# Patient Record
Sex: FEMALE | Race: WHITE
[De-identification: names, ages, dates, MRNs, and addresses within clinical notes are randomized per-mention and may not be internally consistent; named-entity substitution may affect disease eponyms.]

---

## 2017-03-17 ENCOUNTER — HOSPITAL ENCOUNTER (EMERGENCY)
Dept: HOSPITAL 10 - FTE | Age: 52
Discharge: HOME | End: 2017-03-17
Payer: COMMERCIAL

## 2017-03-17 VITALS — BODY MASS INDEX: 40.82 KG/M2 | WEIGHT: 176.37 LBS | HEIGHT: 55 IN

## 2017-03-17 DIAGNOSIS — E11.9: ICD-10-CM

## 2017-03-17 DIAGNOSIS — L02.213: Primary | ICD-10-CM

## 2017-03-17 PROCEDURE — 10061 I&D ABSCESS COMP/MULTIPLE: CPT

## 2017-03-17 NOTE — ERD
ER Documentation


Chief Complaint


Date/Time


DATE: 3/17/17 


TIME: 16:22


Chief Complaint


abscess to chest for the past few days. no fevers no drainage





HPI


Patient is a 51-year-old female with diabetes who presents with an infection.  

The patient says that she has an infection between her breasts.  She says "it 

is hot and tender".  She said that it started as an ingrown pimple last year 

but came back.  She has had no fevers.  The symptoms started on Tuesday.  She 

has had no treatment as of yet.  Upon review of old medical records this is the 

patient's fourth visit to the ER since 2007.





ROS


All systems reviewed and are negative except as per history of present illness.





Medications


Home Meds


Active Scripts


Hydrocodone/Acetaminophen (Norco  Tablet) 1 Each Tablet, 1 TAB PO Q6H Y 

for PAIN, #7 TAB


   Prov:ILIANA MINER MD         3/17/17


Sulfamethoxazole-Trimethoprim* (Bactrim* DS) 800-160 Mg Tab, 1 TAB PO BID for 7 

Days, TAB


   Prov:ILIANA MINER MD         3/17/17


Cephalexin* (Keflex*) 500 Mg Capsule, 500 MG PO QID for 7 Days, CAP


   Prov:ILIANA MINER MD         3/17/17


Pseudoephedrine Hcl (Sudafe 12-Hour) 120 Mg Tablet.er, 120 MG PO BID Y for 

CONGESTION for 14 Days, TAB.SA


   Prov:KRISTA KELSEY-C         11/24/16


Benzonatate* (Tessalon Perle*) 100 Mg Capsule, 100 MG PO Q8H Y for COUGH for 10 

Days, CAP


   Prov:KRISTA KELSEY-C         11/24/16


Amoxicillin* (Amoxicillin*) 500 Mg Cap, 500 MG PO BID for 7 Days, #20 CAP


   Prov:KRISTA KELSEY PA-C         11/24/16


Neomycin/Polymyxin/Hydrocort* (Cortisporin* Otic) 10 Ml Susp, 4 DROP LEFT EAR 

QID, #1 EA


   Prov:KRISTA KELSEY PA-C         11/24/16





Allergies


Allergies:  


Coded Allergies:  


     No Known Allergy (Unverified , 11/23/16)





PMhx/Soc


History of Surgery:  No


Anesthesia Reaction:  No


Hx Neurological Disorder:  No


Hx Psychiatric Problems:  Yes (depression)


Hx Alcohol Use:  No


Hx Substance Use:  No


Hx Tobacco Use:  No





FmHx


Family History:  diabetes





Physical Exam


Vitals





Vital Signs








  Date Time  Temp Pulse Resp B/P Pulse Ox O2 Delivery O2 Flow Rate FiO2


 


3/17/17 10:56 98.7 100 20 140/68 98   








Physical Exam


Const: No acute distress


Head:   Atraumatic 


Eyes:    Normal Conjunctiva


ENT:    Normal External Ears, Nose and Mouth.


Neck:               Full range of motion..~ No meningismus.


Resp:    Clear to auscultation bilaterally


Cardio:    Regular rate and rhythm, no murmurs


Abd:    Soft, non tender, non distended. Normal bowel sounds


Skin:   2 x 3 cm abscess between the breasts with fluctuance and surrounding 

erythema


Back:    No midline or flank tenderness


Ext:    No cyanosis, or edema


Neur:    Awake and alert


Psych:    Normal Mood and Affect


Results 24 hrs





 Current Medications








 Medications


  (Trade)  Dose


 Ordered  Sig/Tess


 Route


 PRN Reason  Start Time


 Stop Time Status Last Admin


Dose Admin


 


 Lidocaine/


 Epinephrine


  (Xylocaine 1%/


 Epi (Mdv) 20 ml)  20 ml  ONCE  ONCE


 SC


   3/17/17 11:30


 3/17/17 11:40 DC  


 


 


 Acetaminophen/


 Hydrocodone Bitart


  (Norco (10/325))  1 tab  ONCE  ONCE


 PO


   3/17/17 11:30


 3/17/17 11:31 DC 3/17/17 11:31


 


 


 Ondansetron HCl


  (Zofran Odt)  4 mg  ONCE  STAT


 ODT


   3/17/17 11:18


 3/17/17 11:20 DC 3/17/17 11:31


 


 


 Cephalexin


  (Keflex)  500 mg  ONCE  ONCE


 PO


   3/17/17 11:30


 3/17/17 11:31 DC 3/17/17 11:31


 


 


 Trimethoprim/


 Sulfamethoxazole


  (Bactrim (Ds))  1 tab  ONCE  ONCE


 PO


   3/17/17 11:30


 3/17/17 11:31 DC 3/17/17 11:31


 


 


 Lidocaine/


 Epinephrine


  (Xylocaine 1%/


 Epi (Mdv) 20 ml)  30 ml  ONCE  ONCE


 SC


   3/17/17 12:00


 3/17/17 12:00 DC  


 


 


 Lidocaine/


 Epinephrine


  (Xylocaine 1%/


 Epi)  30 ml  ONCE  ONCE


 SC


   3/17/17 12:00


 3/17/17 12:01 DC  


 











Procedures/MDM


Abscess Incision and Drainage with irrigation by me:


Location:                   Chest wall between the breasts


Anesthesia:                [Local 1% Lidocaine with epinephrine]


Technique:                  [Irrigated. Disrupted loculations w/ instrumentation

]


Packing:                    [None]


Complications:            [Neurovascularly intact post procedure]





48 hour wound check.  Scar minimization instructions given.





Patient's skin symptoms have stabilized while they have been evaluated in the 

department and are appropriate for outpatient care and work up.


Exam and w/u not consistent w/ sepsis, deep space infection, or foreign body.  

Patient will be given a prescription for Keflex and Bactrim and can follow-up 

with her primary doctor within 2 days for a wound check.





Departure


Diagnosis:  


 Primary Impression:  


 Abscess


Condition:  Fair


Patient Instructions:  Abscess, Incision And Drainage


Referrals:  


Your doctor





Additional Instructions:  


Call your primary care doctor TOMORROW for an appointment during the next 1-2 

days.See the doctor sooner or return here if your condition worsens before your 

appointment time.











ILIANA MINER MD Mar 17, 2017 16:23

## 2017-03-19 ENCOUNTER — HOSPITAL ENCOUNTER (EMERGENCY)
Dept: HOSPITAL 10 - FTE | Age: 52
Discharge: HOME | End: 2017-03-19
Payer: COMMERCIAL

## 2017-03-19 VITALS — HEIGHT: 55 IN | WEIGHT: 181.88 LBS | BODY MASS INDEX: 42.09 KG/M2

## 2017-03-19 DIAGNOSIS — L02.213: Primary | ICD-10-CM

## 2017-03-19 PROCEDURE — 99282 EMERGENCY DEPT VISIT SF MDM: CPT

## 2017-03-19 NOTE — ERD
ER Documentation


Chief Complaint


Date/Time


DATE: 3/19/17 


TIME: 21:05


Chief Complaint


abscess





HPI


51-year-old female presents here in emergency department for recheck of her 

abscess wound. Patient has an abscess wound in the mid chest area, this was 

opened and drained 2 days ago, patient started to take antibiotics as 

prescribed. Patient noticed still draining some pustular discharge, it is still 

open, no iodoform dressing in it. Patient pain is improved, throbbing pain, 4/

10 scale, is worse upon touching the area. Patient denies any fever or chills. 

Patient denies any other new symptoms.





ROS


All systems reviewed and are negative except as per history of present illness.





Medications


Home Meds


Active Scripts


Hydrocodone/Acetaminophen (Norco  Tablet) 1 Each Tablet, 1 TAB PO Q6H Y 

for PAIN, #7 TAB


   Prov:ILIANA MINER MD         3/17/17


Sulfamethoxazole-Trimethoprim* (Bactrim* DS) 800-160 Mg Tab, 1 TAB PO BID for 7 

Days, TAB


   Prov:ILIANA MINER MD         3/17/17


Cephalexin* (Keflex*) 500 Mg Capsule, 500 MG PO QID for 7 Days, CAP


   Prov:ILIANA MINER MD         3/17/17


Pseudoephedrine Hcl (Sudafe 12-Hour) 120 Mg Tablet.er, 120 MG PO BID Y for 

CONGESTION for 14 Days, TAB.SA


   Prov:KRISTA KELSEY-C         11/24/16


Benzonatate* (Tessalon Perle*) 100 Mg Capsule, 100 MG PO Q8H Y for COUGH for 10 

Days, CAP


   Prov:KRISTA KELSEY-C         11/24/16


Amoxicillin* (Amoxicillin*) 500 Mg Cap, 500 MG PO BID for 7 Days, #20 CAP


   Prov:KRISTA KELSEY PA-C         11/24/16


Neomycin/Polymyxin/Hydrocort* (Cortisporin* Otic) 10 Ml Susp, 4 DROP LEFT EAR 

QID, #1 EA


   Prov:KRISTA KELSEY PA-C         11/24/16





Allergies


Allergies:  


Coded Allergies:  


     No Known Allergy (Unverified , 11/23/16)





PMhx/Soc


Medical and Surgical Hx:  pt denies Surgical Hx


History of Surgery:  No


Anesthesia Reaction:  No


Hx Neurological Disorder:  No


Hx Psychiatric Problems:  Yes (depression)


Hx Alcohol Use:  No


Hx Substance Use:  No


Hx Tobacco Use:  No


Smoking Status:  Never smoker





FmHx


Family History:  No coronary disease, No diabetes, No other





Physical Exam


Vitals





Vital Signs








  Date Time  Temp Pulse Resp B/P Pulse Ox O2 Delivery O2 Flow Rate FiO2


 


3/19/17 19:48 97.6 81 20 132/73 99   








Physical Exam


GENERAL:  The patient is well developed and appropriate for usual state of 

health, in no apparent distress.


CHEST:  Clear to auscultation bilaterally. There are no rales, wheezes or 

rhonchi. Noted abscess wound in the mid chest area, with open wound, draining 

pustular discharge, there is an opening, no iodoform dressing noted. No 

tenderness on palpation, mild fluctuance noted.


HEART:  Regular rate and rhythm. No murmurs, clicks, rubs or gallops. No S3 or 

S4.


ABDOMEN:  Soft, nontender and nondistended. Good bowel sounds. No rebound or 

guarding. No gross peritonitis. No gross organomegaly or masses. No Narvaez sign 

or McBurney point tenderness.


BACK:  No midline or flank tenderness.


EXTREMITIES:  Equal pulses bilaterally. There is no peripheral clubbing, 

cyanosis or edema. No focal swelling or erythema. Full range of motion. Grossly 

neurovascularly intact.


NEURO:  Alert and oriented. Cranial nerves 2-12 intact. Motor strength in all 4 

extremities with 5/5 strength.  Sensation grossly intact. Normal speech and 

gait. 


SKIN:  There is no apparent rash or petechia. The skin is warm and dry.


HEMATOLOGIC AND LYMPHATIC:  There is no evidence of excessive bruising or 

lymphedema. No gross cervical, axillary, or inguinal lymphadenopathy.





Procedures/MDM


Procedure note: After patient's verbal consent, the wound was cleaned with 

diluted Betadine, after cleaning the wound, and iodoform dressing was inserted 

in the already opened abscess wound. Dressing was applied afterwards.





Medical decision making: Patient has an abscess wound in the mid chest area, it 

is healing well, and iodoform dressing was applied on affected area to help 

with healing and further drainage of the purulent discharge, patient was 

advised to continue taking antibiotics. No symptoms of any sepsis at this time. 

Patient does not have any fever. no symptoms of any necrosis. Patient is 

advised to follow-up with primary care doctor or here in emergency department 3-

4 days for dressing change, patient was advised to return sooner for any 

worsening symptoms





Departure


Diagnosis:  


 Primary Impression:  


 Soft tissue abscess


Condition:  Stable


Patient Instructions:  Abscess, Incision And Drainage





Additional Instructions:  


continue antibiotics, return 3-4 days for wound check dressing change











OMAR SCHAEFER NP Mar 19, 2017 21:09

## 2017-03-22 ENCOUNTER — HOSPITAL ENCOUNTER (EMERGENCY)
Dept: HOSPITAL 10 - E/R | Age: 52
Discharge: HOME | End: 2017-03-22
Payer: COMMERCIAL

## 2017-03-22 VITALS
BODY MASS INDEX: 33.5 KG/M2 | HEIGHT: 64 IN | HEIGHT: 64 IN | WEIGHT: 196.21 LBS | BODY MASS INDEX: 33.5 KG/M2 | WEIGHT: 196.21 LBS

## 2017-03-22 DIAGNOSIS — E11.9: ICD-10-CM

## 2017-03-22 DIAGNOSIS — Z48.00: Primary | ICD-10-CM

## 2017-03-22 PROCEDURE — 99281 EMR DPT VST MAYX REQ PHY/QHP: CPT

## 2017-03-22 NOTE — ERD
ER Documentation


Chief Complaint


Date/Time


DATE: 3/22/17 


TIME: 15:20


Chief Complaint


wound check





HPI


51-year-old female here for follow-up wound check after incision and drainage.  

She is still taking the prescribed antibiotics.  Denies increased pain or 

erythema at the wound site.  Denies fever.





ROS


All systems reviewed and are negative except as per history of present illness.





Medications


Home Meds


Active Scripts


Hydrocodone/Acetaminophen (Norco  Tablet) 1 Each Tablet, 1 TAB PO Q6H Y 

for PAIN, #7 TAB


   Prov:ILIANA MINER MD         3/17/17


Sulfamethoxazole-Trimethoprim* (Bactrim* DS) 800-160 Mg Tab, 1 TAB PO BID for 7 

Days, TAB


   Prov:ILIANA MINER MD         3/17/17


Cephalexin* (Keflex*) 500 Mg Capsule, 500 MG PO QID for 7 Days, CAP


   Prov:ILIANA MINER MD         3/17/17


Pseudoephedrine Hcl (Sudafe 12-Hour) 120 Mg Tablet.er, 120 MG PO BID Y for 

CONGESTION for 14 Days, TAB.SA


   Prov:KRISTA KELSEY PA-C         11/24/16


Benzonatate* (Tessalon Perle*) 100 Mg Capsule, 100 MG PO Q8H Y for COUGH for 10 

Days, CAP


   Prov:KRISTA KELSEY-C         11/24/16


Amoxicillin* (Amoxicillin*) 500 Mg Cap, 500 MG PO BID for 7 Days, #20 CAP


   Prov:KRISTA KELSEY-C         11/24/16


Neomycin/Polymyxin/Hydrocort* (Cortisporin* Otic) 10 Ml Susp, 4 DROP LEFT EAR 

QID, #1 EA


   Prov:KRISTA KELSEY-C         11/24/16





Allergies


Allergies:  


Coded Allergies:  


     No Known Allergy (Unverified , 11/23/16)





PMhx/Soc


History of Surgery:  No


Anesthesia Reaction:  No


Hx Neurological Disorder:  No


Hx Psychiatric Problems:  Yes (depression)


Hx Alcohol Use:  No


Hx Substance Use:  No


Hx Tobacco Use:  No





Physical Exam


Vitals





Vital Signs








  Date Time  Temp Pulse Resp B/P Pulse Ox O2 Delivery O2 Flow Rate FiO2


 


3/22/17 14:47 98.1 90 20 125/70 99   








Physical Exam


General impression:  Well-developed, well-nourished.  Alert, oriented, in no 

acute distress


Head:    Normocephalic, atraumatic.


Respiration:    Normal respiratory effort. Lungs clear to auscultate 

bilaterally. No wheezes, rales or rhonchi.


Cardiovascular: Regular rate and rhythm. No murmurs or extra heart sounds.


Neuro:    Mental status normal, speech normal. CNS grossly intact. 


Skin:    Normal turgor.  Incision wound noted in the anterior chest.


Psych:    Normal mood and affect.





Procedures/MDM


Packing removed from the incision site at the anterior chest.  Slight 

serosanguineous drainage noted.  Wound shows no evidence of infection or 

foreign body.


Patient appropriate for outpatient follow up.





Departure


Diagnosis:  


 Primary Impression:  


 Encounter for wound re-check


Condition:  Good


Patient Instructions:  Wound Care


Referrals:  


[facility]


YOU HAVE RECEIVED A MEDICAL SCREENING EXAM AND THE RESULTS INDICATE THAT YOU DO 

NOT HAVE A CONDITION THAT REQUIRES URGENT TREATMENT IN THE EMERGENCY DEPARTMENT.





FURTHER EVALUATION AND TREATMENT OF YOUR CONDITION CAN WAIT UNTIL YOU ARE SEEN 

IN YOUR DOCTORS OFFICE WITHIN THE NEXT 1-2 DAYS. IT IS YOUR RESPONSIBILITY TO 

MAKE AN APPOINTMENT FOR FOLOW-UP CARE.





IF YOU HAVE A PRIMARY DOCTOR


--you should call your primary doctor and schedule an appointment





IF YOU DO NOT HAVE A PRIMARY DOCTOR YOU CAN CALL OUR PHYSICIAN REFERRAL HOTLINE 

AT


 [phone] 





IF YOU CAN NOT AFFORD TO SEE A PHYSICIAN YOU CAN CHOSE FROM THE FOLLOWING 

[facility]





[facility] [phone] [address] [facility]. [facility] [phone] [address]. [facility] [phone] [address] [facility]. [facility] [phone] [address] [facility] [phone] [address] [facility]. [facility] [phone] [address]





Additional Instructions:  


Call your primary care doctor TOMORROW for an appointment during the next 1 

WEEK.Tell the  that you were referred from this facility.See the 

doctor sooner or return here if your  condition worsens before your appointment 

time.











ROSIE CHAHAL NP Mar 22, 2017 15:23

## 2017-12-20 ENCOUNTER — HOSPITAL ENCOUNTER (EMERGENCY)
Dept: HOSPITAL 10 - E/R | Age: 52
LOS: 1 days | Discharge: HOME | End: 2017-12-21
Payer: COMMERCIAL

## 2017-12-20 VITALS
BODY MASS INDEX: 29.64 KG/M2 | BODY MASS INDEX: 29.64 KG/M2 | HEIGHT: 65 IN | WEIGHT: 177.91 LBS | HEIGHT: 65 IN | WEIGHT: 177.91 LBS

## 2017-12-20 DIAGNOSIS — M54.5: ICD-10-CM

## 2017-12-20 DIAGNOSIS — H10.9: Primary | ICD-10-CM

## 2017-12-20 PROCEDURE — 99283 EMERGENCY DEPT VISIT LOW MDM: CPT

## 2017-12-20 NOTE — ERD
ER Documentation


Chief Complaint


Chief Complaint


on and off redness both eyes x 3 months





HPI


Is a 52-year-old female presenting to the emergency department with eye redness 

that comes and goes for the past few months.  Patient denies any discharge, 

vision changes.  She states that it does itch from time to time she states that 

she has been seen seen here before and was given antibiotic eyedrops.  Patient 

also states that she has lower back pain for the past few days that worsens 

with movement.  She denies any saddle anesthesia, bladder or bowel incontinence





ROS


All systems reviewed and are negative except as per history of present illness.





Medications


Home Meds


Active Scripts


Mineral Oil/Petrolatum,White (ARTIFICIAL TEARS EYE OINT) 3.5 Gm Oint...g., 1 

APPLIC BOTH EYES AS NEEDED Y for DRY EYES for 7 Days, #1 EA


   Prov:ANIL BLACKBURN PA-C         12/20/17


Loratadine* (Claritin*) 10 Mg Tablet, 10 MG PO DAILY, #30 TAB


   Prov:ANIL BLACKBURN PA-C         12/20/17


Cyclobenzaprine Hcl* (Cyclobenzaprine Hcl*) 10 Mg Tablet, 10 MG PO TID, #30 TAB


   Prov:ANIL BLACKBURN PA-C         12/20/17


Ibuprofen* (Motrin*) 600 Mg Tab, 600 MG PO Q6H Y for PAIN AND OR ELEVATED TEMP, 

#30 TAB


   Prov:ANIL BLACKBURN PA-C         12/20/17


Tramadol HCl (Tramadol HCl) 50 Mg Tablet, 50 MG PO Q4 Y for PAIN, #20 TAB


   Prov:SURJIT NUNEZ PA-C         9/20/17


Cephalexin* (Keflex*) 500 Mg Capsule, 500 MG PO QID for 7 Days, CAP


   Prov:SURJIT NUNEZ PA-C         9/20/17


Polymyxin B Sulfate-TMP* (Polymyxin B-TMP Eye Drops*) 10 Ml Drops, 1 DROP BOTH 

EYES QID for 7 Days, EA


   Prov:SURJIT NUNEZ PA-C         9/20/17


Hydrocodone/Acetaminophen (Norco  Tablet) 1 Each Tablet, 1 TAB PO Q6H Y 

for PAIN, #7 TAB


   Prov:ILIANA MINER MD         3/17/17


Sulfamethoxazole-Trimethoprim* (Bactrim* DS) 800-160 Mg Tab, 1 TAB PO BID for 7 

Days, TAB


   Prov:ILIANA MINER MD         3/17/17


Cephalexin* (Keflex*) 500 Mg Capsule, 500 MG PO QID for 7 Days, CAP


   Prov:ILIANA MINER MD         3/17/17


Pseudoephedrine Hcl (Sudafe 12-Hour) 120 Mg Tablet.er, 120 MG PO BID Y for 

CONGESTION for 14 Days, TAB.SA


   Prov:KRISTA KELSEY PA-C         11/24/16


Benzonatate* (Tessalon Perle*) 100 Mg Capsule, 100 MG PO Q8H Y for COUGH for 10 

Days, CAP


   Prov:KRISTA KELSEY-C         11/24/16


Amoxicillin* (Amoxicillin*) 500 Mg Cap, 500 MG PO BID for 7 Days, #20 CAP


   Prov:KRISTA KELSEY-C         11/24/16


Neomycin/Polymyxin/Hydrocort* (Cortisporin* Otic) 10 Ml Susp, 4 DROP LEFT EAR 

QID, #1 EA


   Prov:KRISTA KELSEY-C         11/24/16





Allergies


Allergies:  


Coded Allergies:  


     No Known Allergy (Unverified , 12/20/17)





PMhx/Soc


History of Surgery:  No


Anesthesia Reaction:  No


Hx Neurological Disorder:  No


Hx Psychiatric Problems:  Yes (depression)


Hx Alcohol Use:  No


Hx Substance Use:  No


Hx Tobacco Use:  No





Physical Exam


Vitals





Vital Signs








  Date Time  Temp Pulse Resp B/P Pulse Ox O2 Delivery O2 Flow Rate FiO2


 


12/20/17 18:38 97.9 88 20 119/85 98   








Physical Exam


Const:     WDWN


Head:   Atraumatic 


Eyes:    Normal Conjunctiva


ENT:    Normal External Ears, Nose and Mouth.


Neck:               Full range of motion..~ No meningismus.


Resp:    Clear to auscultation bilaterally


Cardio:    Regular rate and rhythm, no murmurs


Abd:    Soft, non tender, non distended. Normal bowel sounds


Skin:    No petechiae or rashes


Back:    No midline or flank tenderness


Mild tender to palpation of bilateral lumbar region


Ext:    No cyanosis, or edema


Neur:    Awake and alert


Psych:    Normal Mood and Affect





Procedures/MDM


This is a 52-year-old female presenting to the emergency department complaining 

of on and off eye redness for the past few months.  On examination patient did 

not have any erythema in her conjunctiva.  It appears that maybe she is 

describing that she may have dry eyes or allergic and divided since then she 

states that it itches sometimes.  Prescription for Claritin was provided.  Low 

evidence of bacterial conjunctivitis.  Patient is also describing that she has 

lower back pain that does not work with Tylenol or ibuprofen.  I discussed with 

her that she will need to follow-up with her primary care physician to get a 

referral for physical therapy.  I have given her prescription for ibuprofen, 

and Flexeril.  No evidence of cauda equina.  She stable to discharged home to 

follow-up with a ophthalmologist and primary care





Departure


Diagnosis:  


 Primary Impression:  


 Conjunctivitis


 Additional Impression:  


 Back pain


Condition:  Stable


Patient Instructions:  Back Pain (Acute Or Chronic)


Referrals:  


Count includes the [facility]


YOU HAVE RECEIVED A MEDICAL SCREENING EXAM AND THE RESULTS INDICATE THAT YOU DO 

NOT HAVE A CONDITION THAT REQUIRES URGENT TREATMENT IN THE EMERGENCY DEPARTMENT.





FURTHER EVALUATION AND TREATMENT OF YOUR CONDITION CAN WAIT UNTIL YOU ARE SEEN 

IN YOUR DOCTORS OFFICE WITHIN THE NEXT 1-2 DAYS. IT IS YOUR RESPONSIBILITY TO 

MAKE AN [facility].





IF YOU HAVE A PRIMARY DOCTOR


--you should call your primary doctor and schedule an appointment





IF YOU DO NOT HAVE A PRIMARY DOCTOR YOU CAN CALL OUR PHYSICIAN REFERRAL HOTLINE 

AT


 [phone] 





IF YOU CAN NOT AFFORD TO SEE A PHYSICIAN YOU CAN CHOSE FROM THE FOLLOWING 

Count includes the [facility]





[facility] [phone] [address] [facility] [phone] [address] [facility]. [facility] [phone] [address] [facility] [phone] [address] [facility] [phone] [address] [facility]. [facility]. [phone] [address] [facility] EYE [place]


Hours: Mon - Fri


9:00 AM - 5:00 PM











ANIL BLACKBURN PA-C Dec 20, 2017 22:04

## 2018-05-31 ENCOUNTER — HOSPITAL ENCOUNTER (EMERGENCY)
Age: 53
LOS: 1 days | Discharge: HOME | End: 2018-06-01

## 2018-05-31 ENCOUNTER — HOSPITAL ENCOUNTER (EMERGENCY)
Dept: HOSPITAL 91 - FTE | Age: 53
LOS: 1 days | Discharge: HOME | End: 2018-06-01
Payer: COMMERCIAL

## 2018-05-31 DIAGNOSIS — M25.512: Primary | ICD-10-CM

## 2018-05-31 PROCEDURE — 73030 X-RAY EXAM OF SHOULDER: CPT

## 2018-05-31 PROCEDURE — 99284 EMERGENCY DEPT VISIT MOD MDM: CPT

## 2018-05-31 PROCEDURE — 96372 THER/PROPH/DIAG INJ SC/IM: CPT

## 2018-05-31 PROCEDURE — 72040 X-RAY EXAM NECK SPINE 2-3 VW: CPT

## 2018-05-31 RX ADMIN — KETOROLAC TROMETHAMINE 1 MG: 15 INJECTION, SOLUTION INTRAMUSCULAR; INTRAVENOUS at 23:44

## 2018-06-01 RX ADMIN — DIAZEPAM 1 MG: 2 TABLET ORAL at 00:25

## 2018-06-01 RX ADMIN — HYDROCODONE BITARTRATE AND ACETAMINOPHEN 1 TAB: 5; 325 TABLET ORAL at 00:51

## 2018-06-01 RX ADMIN — HYDROCODONE BITARTRATE AND ACETAMINOPHEN 1 TAB: 5; 325 TABLET ORAL at 00:52

## 2018-07-11 ENCOUNTER — APPOINTMENT (RX ONLY)
Dept: URBAN - METROPOLITAN AREA CLINIC 47 | Facility: CLINIC | Age: 53
Setting detail: DERMATOLOGY
End: 2018-07-11

## 2018-07-11 DIAGNOSIS — Z41.9 ENCOUNTER FOR PROCEDURE FOR PURPOSES OTHER THAN REMEDYING HEALTH STATE, UNSPECIFIED: ICD-10-CM

## 2018-07-11 PROCEDURE — ? BOTOX

## 2018-07-11 NOTE — PROCEDURE: BOTOX
Masseter Units: 0
Expiration Date (Month Year): 11/20
Consent: Written consent obtained. Risks include but not limited to lid/brow ptosis, bruising, swelling, diplopia, temporary effect, incomplete chemical denervation.
Lot #: [id number]
Forehead Units: 15
Detail Level: Detailed
Nasal Root Units: 6
Post-Care Instructions: Patient instructed to not lie down for 4 hours and limit physical activity for 24 hours. Patient instructed not to travel by airplane for 48 hours.
Dilution (U/0.1 Cc): 3.5
Periorbital Skin Units: 12

## 2019-06-27 ENCOUNTER — HOSPITAL ENCOUNTER (EMERGENCY)
Dept: HOSPITAL 10 - FTE | Age: 54
Discharge: HOME | End: 2019-06-27
Payer: COMMERCIAL

## 2019-06-27 ENCOUNTER — HOSPITAL ENCOUNTER (EMERGENCY)
Dept: HOSPITAL 91 - FTE | Age: 54
Discharge: HOME | End: 2019-06-27
Payer: COMMERCIAL

## 2019-06-27 VITALS
BODY MASS INDEX: 28.72 KG/M2 | WEIGHT: 172.4 LBS | HEIGHT: 65 IN | WEIGHT: 172.4 LBS | BODY MASS INDEX: 28.72 KG/M2 | HEIGHT: 65 IN

## 2019-06-27 VITALS — HEART RATE: 77 BPM | DIASTOLIC BLOOD PRESSURE: 70 MMHG | RESPIRATION RATE: 20 BRPM | SYSTOLIC BLOOD PRESSURE: 125 MMHG

## 2019-06-27 DIAGNOSIS — N61.1: Primary | ICD-10-CM

## 2019-06-27 PROCEDURE — 99283 EMERGENCY DEPT VISIT LOW MDM: CPT

## 2019-06-27 RX ADMIN — HYDROCODONE BITARTRATE AND ACETAMINOPHEN 1 TAB: 5; 325 TABLET ORAL at 21:38

## 2019-06-27 NOTE — ERD
ER Documentation


Chief Complaint


Chief Complaint





Abscess between breasts X 1 wk





HPI


Patient is a 53-year-old female, presents the ER for concerns of an abscess 


between her breasts x1 week.  Patient states she has had an abscess at the 


affected site in the past.  Patient denies any fevers or chills.  Patient states


she has been pushing on the affected area however nothing is coming out.  Denies


any left-sided chest pain, shortness of breath, left upper extremity pain, 


diaphoresis or LOC.





ROS


All systems reviewed and are negative except as per history of present illness.





Medications


Home Meds


Active Scripts


Naloxone HCl nasal spray (Narcan 4 mg/0.1 mL nasal) 4 Mg Spray, 4 MG NS .Q2-3MIN


for OPIOID OVERDOSE, #2 SPRAY 0 Refills


   Spray 0.1 mL into one nostril.  Repeat with second device into other


   nostril after 2-3 minutes if no or minimal response


   Prov:MANJU ALVAREZ-C         6/27/19


Hydrocodone/Acetaminophen (Norco 5-325 Tablet) 1 Each Tablet, 1 TAB PO Q6H PRN 


for PAIN, #7 TAB


   Prov:MANJU ALVAREZ-C         6/27/19


Sulfamethoxazole/Trimethoprim* (Bactrim Ds* Tablet) 1 Each Tablet, 1 TAB PO BID,


#14 TAB


   Prov:MANJU ALVAREZ-C         6/27/19


Cephalexin* (Keflex*) 500 Mg Capsule, 500 MG PO TID for 7 Days, CAP


   Prov:MANJU ALVAREZ-C         6/27/19


Orphenadrine Citrate (Norflex) 100 Mg Tablet.sa, 100 MG PO BID for 3 Days, TAB.S


A


   Prov:KARISSA KENT C         6/1/18


Ibuprofen* (Motrin*) 600 Mg Tab, 600 MG PO Q6, #30 TAB


   Prov:DONTEKARISSA C         6/1/18


Hydrocodone/Acetaminophen (Norco 5-325 Tablet) 1 Each Tablet, 1 TAB PO Q6H PRN 


for PAIN, #10 TAB


   Prov:DONTEKARISSA C         6/1/18


Mineral Oil/Petrolatum,White (ARTIFICIAL TEARS EYE OINT) 3.5 Gm Oint...g., 1 


APPLIC BOTH EYES AS NEEDED PRN for DRY EYES for 7 Days, #1 EA


   Prov:ANIL BLACKBURN PA-C         12/20/17


Loratadine* (Claritin*) 10 Mg Tablet, 10 MG PO DAILY, #30 TAB


   Prov:ANIL BLACKBURN PA-C         12/20/17


Cyclobenzaprine Hcl* (Cyclobenzaprine Hcl*) 10 Mg Tablet, 10 MG PO TID, #30 TAB


   Prov:ANIL BLACKBURN PA-C         12/20/17


Ibuprofen* (Motrin*) 600 Mg Tab, 600 MG PO Q6H PRN for PAIN AND OR ELEVATED 


TEMP, #30 TAB


   Prov:ANIL BLACKBURN PA-C         12/20/17


Tramadol HCl (Tramadol HCl) 50 Mg Tablet, 50 MG PO Q4 PRN for PAIN, #20 TAB


   Prov:SURJIT NUNEZ PA-C         9/20/17


Cephalexin* (Keflex*) 500 Mg Capsule, 500 MG PO QID for 7 Days, CAP


   Prov:SURJIT NUNEZ PA-C         9/20/17


Polymyxin B Sulfate-TMP* (Polymyxin B-TMP Eye Drops*) 10 Ml Drops, 1 DROP BOTH 


EYES QID for 7 Days, EA


   Prov:SURJIT NUNEZ PA-C         9/20/17


Hydrocodone/Acetaminophen (Norco  Tablet) 1 Each Tablet, 1 TAB PO Q6H PRN 


for PAIN, #7 TAB


   Prov:ILIANA MINER MD         3/17/17


Sulfamethoxazole-Trimethoprim* (Bactrim* DS) 800-160 Mg Tab, 1 TAB PO BID for 7 


Days, TAB


   Prov:ILIANA MINER MD         3/17/17


Cephalexin* (Keflex*) 500 Mg Capsule, 500 MG PO QID for 7 Days, CAP


   Prov:ILIANA MINER MD         3/17/17


Pseudoephedrine Hcl (Sudafe 12-Hour) 120 Mg Tablet.er, 120 MG PO BID PRN for 


CONGESTION for 14 Days, TAB.SA


   Prov:KRISTA KELSEY PA-C         11/24/16


Benzonatate* (Tessalon Perle*) 100 Mg Capsule, 100 MG PO Q8H PRN for COUGH for 


10 Days, CAP


   Prov:KRISTA KELSEY PA-C         11/24/16


Amoxicillin* (Amoxicillin*) 500 Mg Cap, 500 MG PO BID for 7 Days, #20 CAP


   Prov:ALISA KELSEYMAT MANE         11/24/16


Neomycin/Polymyxin/Hydrocort* (Cortisporin* Otic) 10 Ml Susp, 4 DROP LEFT EAR 


QID, #1 EA


   Prov:ALISA KELSEYMAT MANE         11/24/16





Allergies


Allergies:  


Coded Allergies:  


     No Known Allergy (Unverified , 12/20/17)





PMhx/Soc


History of Surgery:  Yes (appy)


Anesthesia Reaction:  No


Hx Neurological Disorder:  Yes (neuropathy- gabapentin)


Hx Psychiatric Problems:  Yes (depression)


Hx Alcohol Use:  No


Hx Substance Use:  No


Hx Tobacco Use:  No





FmHx


Family History:  No diabetes





Physical Exam


Vitals





Vital Signs


  Date      Temp  Pulse  Resp  B/P (MAP)   Pulse Ox  O2          O2 Flow    FiO2


Time                                                 Delivery    Rate


   6/27/19  98.3     88    18      129/70        99


     19:38                           (89)





Physical Exam


GENERAL: Well-developed, well-nourished female. Appears in no acute distress. 


HEAD: Normocephalic, atraumatic. 


EYES: Pupils are equally reactive bilaterally. EOMs grossly intact. No 


conjunctival erythema. 


ENT: Moist mucous membranes. No uvula deviation. No kissing tonsils. 


NECK: Supple. No meningismus. Normal range of motion of the neck.


LUNG: No respiratory distress.


NEUROLOGIC: Alert and oriented. Moving all four extremities without any 


difficulty. Normal speech. Steady gait. 


SKIN: 1 cm round abscess noted in between breasts.  Nonerythematous.  No warmth.


 Area is tender to touch.  No streaking.  Area is indurated, no fluctuance.


Results 24 hrs





Current Medications


 Medications
   Dose
          Sig/Tess
       Start Time
   Status  Last


 (Trade)       Ordered        Route
 PRN     Stop Time              Admin
Dose


                              Reason                                Admin


                1 tab          ONCE  ONCE
    6/27/19       DC           6/27/19


Acetaminophen                 PO
            22:00
                       21:38



/
                                           6/27/19 22:01


Hydrocodone


Bitart



([place]


(5/325))








Procedures/MDM


MEDICAL DECISION MAKING:


This is a 53-year-old female presents the ER for concerns of a abscess in 


between her breast for 1 week.  Vital signs were reviewed. Patient was afebrile.


 On exam, 1 cm round abscess noted in between the breast.  Area was indurated, 


no fluctuance was noted.


I explained to the patient that she will need to apply warm compresses to the 


affected area and return in 2 days for reexamination and possible I&D at this 


time.  Patient was advised not to squeeze the affected area.  Patient will be 


discharged home with prescription for Keflex and Bactrim.  At this time, the 


patient's presentation is most consistent with abscess.  Low suspicion for deep 


space infection, necrotizing fasciitis, sepsis, gangrene, cellulitis, ACS.  


Patient was nontoxic, non-ill-appearing prior to discharge.





PRESCRIPTIONS:


Norco, Narcan, Keflex, Bactrim





The patient has been prescribed Norco during this encounter. The patient has 


been warned about the use of narcotics.  The patient should not drive or operate


heavy machinery while taking this medication.  The patient was also warned about


the addictive properties of narcotic medications.


Prescription of Narcan was provided.





DISCHARGE:


At this time, patient is stable for discharge and outpatient management. I have 


advised the patient to avoid any new products, creams or possible allergens. I 


have advised the patient to avoid scratching the lesions. I have instructed the 


patient to follow-up with his/her primary care physician in 1-2 days. If 


symptoms persist, patient may need to see a dermatologist for further 


examinations and testing. I have instructed the patient to promptly return to 


the ER at any time for any new or worsening symptoms including increased pain, 


fever, redness, swelling, warmth, difficulty breathing or vomiting. The patient 


and/or family expressed understanding of and agreement with this plan. All 


questions were answered. Home care instructions were provided. 





Disclaimer: Inadvertent spelling and grammatical errors are likely due to 


EHR/dictation software use and do not reflect on the overall quality of patient 


care. Also, please note that the electronic time recorded on this note does not 


necessarily reflect the actual time of the patient encounter.





Departure


Diagnosis:  


   Primary Impression:  


   Abscess


Condition:  Fair


Patient Instructions:  Abscess, Antiobiotic Treatment Only


Referrals:  


YASHIRA ONEAL MD (PCP)








ELYSIA SCOTT MD,CHANTELLE MERIDA MD,ABDIEL VILLANUEVA MD





Additional Instructions:  


Apply warm compresses to the affected area.  Recheck advised in 2 days.








Do not take Norco when driving or operating any machinery.











MANJU ALVAREZ PA-C             Jun 27, 2019 22:11

## 2019-07-02 ENCOUNTER — HOSPITAL ENCOUNTER (EMERGENCY)
Dept: HOSPITAL 91 - FTE | Age: 54
Discharge: HOME | End: 2019-07-02
Payer: COMMERCIAL

## 2019-07-02 ENCOUNTER — HOSPITAL ENCOUNTER (EMERGENCY)
Dept: HOSPITAL 10 - FTE | Age: 54
Discharge: HOME | End: 2019-07-02
Payer: COMMERCIAL

## 2019-07-02 VITALS — RESPIRATION RATE: 18 BRPM | DIASTOLIC BLOOD PRESSURE: 70 MMHG | HEART RATE: 62 BPM | SYSTOLIC BLOOD PRESSURE: 130 MMHG

## 2019-07-02 VITALS
HEIGHT: 65 IN | WEIGHT: 174.61 LBS | BODY MASS INDEX: 29.09 KG/M2 | WEIGHT: 174.61 LBS | HEIGHT: 65 IN | BODY MASS INDEX: 29.09 KG/M2

## 2019-07-02 DIAGNOSIS — E11.9: ICD-10-CM

## 2019-07-02 DIAGNOSIS — L02.213: Primary | ICD-10-CM

## 2019-07-02 PROCEDURE — 96372 THER/PROPH/DIAG INJ SC/IM: CPT

## 2019-07-02 PROCEDURE — 76536 US EXAM OF HEAD AND NECK: CPT

## 2019-07-02 PROCEDURE — 99285 EMERGENCY DEPT VISIT HI MDM: CPT

## 2019-07-02 PROCEDURE — 10060 I&D ABSCESS SIMPLE/SINGLE: CPT

## 2019-07-02 RX ADMIN — HYDROCODONE BITARTRATE AND ACETAMINOPHEN 1 TAB: 10; 325 TABLET ORAL at 02:51

## 2019-07-02 RX ADMIN — LIDOCAINE HYDROCHLORIDE,EPINEPHRINE BITARTRATE 1 ML: 20; .01 INJECTION, SOLUTION INFILTRATION; PERINEURAL at 03:49

## 2019-07-02 RX ADMIN — ONDANSETRON 1 MG: 4 TABLET, ORALLY DISINTEGRATING ORAL at 04:21

## 2019-07-02 NOTE — ERD
ER Documentation


Chief Complaint


Chief Complaint





Boil between breasts X 1 wk.





HPI


This is a 53-year-old female presents emergency department with complaints of 


abscess between her breast for about 1 week.  Stated that she was here last 


Monday and was prescribed with Bactrim and Keflex.  Stated that the pain is 


increased that and this is the reason why she came here to the emergency 


department.





LMP:   Last year.   M1.





Denies headache, head injury, loss of consciousness, dizziness, neck pain, neck 


stiffness, throat pain, difficulty swallowing, difficulty breathing lying flat, 


shoulder pain, chest pain, back pain, abdominal pain, nausea, vomiting, 


constipation, diarrhea, urinary symptoms, pregnancy or possibility being 


pregnant, loss of bowel and bladder control, trauma, injury, falls, difficulty 


walking due to pain, numbness or tingling sensation, calf pain, recent travel, 


recent major surgery in the last 3 weeks, calf pain, recent long travel, recent 


exposure to any illness, recent antibiotic use in the last 3 months, fever, 


chills, seizures.





Past medical history: Diabetes.


Surgical history: Denies.


Social: Denies smoking, use of alcoholic beverages, use of illegal drugs.





ROS


All systems reviewed and are negative except as per history of present illness.





Medications


Home Meds


Active Scripts


Tramadol HCl (Tramadol HCl) 50 Mg Tablet, 50 MG PO Q4 PRN for SEVERE PAIN LEVEL 


7-10, #5 TAB


   Prov:GRAYSON CARTER         19


Omeprazole* (Omeprazole*) 40 Mg Capsule.dr, 40 MG PO DAILY, #30 CAP


   Prov:GRAYSON CARTER         19


Ibuprofen* (Motrin*) 800 Mg Tab, 800 MG PO Q6H PRN for PAIN AND OR ELEVATED 


TEMP, #30 TAB


   Prov:GRAYSON CARTER         19


Naloxone HCl nasal spray (Narcan 4 mg/0.1 mL nasal) 4 Mg Spray, 4 MG NS .Q2-3MIN


for OPIOID OVERDOSE, #2 SPRAY 0 Refills


   Spray 0.1 mL into one nostril.  Repeat with second device into other


   nostril after 2-3 minutes if no or minimal response


   Prov:MANJU ALVAREZ PA-C         19


Hydrocodone/Acetaminophen (Norco 5-325 Tablet) 1 Each Tablet, 1 TAB PO Q6H PRN 


for PAIN, #7 TAB


   Prov:MANJU ALVAREZ PA-C         19


Sulfamethoxazole/Trimethoprim* (Bactrim Ds* Tablet) 1 Each Tablet, 1 TAB PO BID,


#14 TAB


   Prov:KIMMANJU MANE         19


Cephalexin* (Keflex*) 500 Mg Capsule, 500 MG PO TID for 7 Days, CAP


   Prov:MANJU ALVAREZ PA-C         19


Orphenadrine Citrate (Norflex) 100 Mg Tablet.sa, 100 MG PO BID for 3 Days, 


TAB.SA


   Prov:KARISSA KENT         18


Ibuprofen* (Motrin*) 600 Mg Tab, 600 MG PO Q6, #30 TAB


   Prov:KARISSA KENT         18


Hydrocodone/Acetaminophen (Norco 5-325 Tablet) 1 Each Tablet, 1 TAB PO Q6H PRN 


for PAIN, #10 TAB


   Prov:KARISSA KENT         18


Mineral Oil/Petrolatum,White (ARTIFICIAL TEARS EYE OINT) 3.5 Gm Oint...g., 1 


APPLIC BOTH EYES AS NEEDED PRN for DRY EYES for 7 Days, #1 EA


   Prov:ANIL BLACKBURN PA-C         17


Loratadine* (Claritin*) 10 Mg Tablet, 10 MG PO DAILY, #30 TAB


   Prov:ANIL BLACKBURN PA-C         17


Cyclobenzaprine Hcl* (Cyclobenzaprine Hcl*) 10 Mg Tablet, 10 MG PO TID, #30 TAB


   Prov:ANIL BLACKBURN PA-C         17


Ibuprofen* (Motrin*) 600 Mg Tab, 600 MG PO Q6H PRN for PAIN AND OR ELEVATED 


TEMP, #30 TAB


   Prov:ANIL BLACKBURN PA-C         17


Tramadol HCl (Tramadol HCl) 50 Mg Tablet, 50 MG PO Q4 PRN for PAIN, #20 TAB


   Prov:SURJIT NUNEZ PA-C         17


Cephalexin* (Keflex*) 500 Mg Capsule, 500 MG PO QID for 7 Days, CAP


   Prov:SURJIT NUNEZ PA-C         17


Polymyxin B Sulfate-TMP* (Polymyxin B-TMP Eye Drops*) 10 Ml Drops, 1 DROP BOTH 


EYES QID for 7 Days, EA


   Prov:SURJIT NUNEZ PA-C         17


Hydrocodone/Acetaminophen (Norco  Tablet) 1 Each Tablet, 1 TAB PO Q6H PRN 


for PAIN, #7 TAB


   Prov:ILIANA MINER MD         3/17/17


Sulfamethoxazole-Trimethoprim* (Bactrim* DS) 800-160 Mg Tab, 1 TAB PO BID for 7 


Days, TAB


   Prov:ILIANA MINER MD         3/17/17


Cephalexin* (Keflex*) 500 Mg Capsule, 500 MG PO QID for 7 Days, CAP


   Prov:ILIANA MINER MD         3/17/17


Pseudoephedrine Hcl (Sudafe 12-Hour) 120 Mg Tablet.er, 120 MG PO BID PRN for 


CONGESTION for 14 Days, TAB.SA


   Prov:KRISTA KELSEY PA-C         16


Benzonatate* (Tessalon Perle*) 100 Mg Capsule, 100 MG PO Q8H PRN for COUGH for 


10 Days, CAP


   Prov:KRISTA KELSEY PA-C         16


Amoxicillin* (Amoxicillin*) 500 Mg Cap, 500 MG PO BID for 7 Days, #20 CAP


   Prov:KRISTA KELSEY PA-C         16


Neomycin/Polymyxin/Hydrocort* (Cortisporin* Otic) 10 Ml Susp, 4 DROP LEFT EAR 


QID, #1 EA


   Prov:KRISTA KELSEY PA-C         16





Allergies


Allergies:  


Coded Allergies:  


     No Known Allergy (Unverified , 17)





PMhx/Soc


History of Surgery:  Yes (appy)


Anesthesia Reaction:  No


Hx Neurological Disorder:  Yes (neuropathy- gabapentin)


Hx Psychiatric Problems:  Yes (depression)


Hx Alcohol Use:  No


Hx Substance Use:  No


Hx Tobacco Use:  No


Smoking Status:  Never smoker





Physical Exam


Vitals





Physical Exam


Const:   No acute distress


Head:   Atraumatic 


Eyes:    Normal Conjunctiva


ENT:    Normal External Ears, Nose and Mouth.


Neck:               Full range of motion. No meningismus.


Resp:   Clear to auscultation bilaterally


Cardio:   Regular rate and rhythm, no murmurs


Abd:    Soft, non tender, non distended. Normal bowel sounds


Skin:   No petechiae or rashes.  Examined with female chaperone, Ailyn EMT.  


Abscess measuring approximately 3 cm in diameter to mid sternum.  Has 


fluctuance.  No vesicular lesions.


Back:   No midline or flank tenderness


Ext:    No cyanosis, or edema


Neur:   Awake and alert


Psych:    Normal Mood and Affect


Results 24 hrs





Current Medications


 Medications
   Dose
          Sig/Tess
       Start Time
   Status  Last


 (Trade)       Ordered        Route
 PRN     Stop Time              Admin
Dose


                              Reason                                Admin


                1 tab          ONCE  ONCE
    19        DC            19


Acetaminophen                 PO
            03:00
 19                02:51



/
                                           03:01


Hydrocodone


Bitart



(Norco


(10/325))


 Lidocaine/
    20 ml          ONCE  ONCE
    19        DC       



Epinephrine
                  INJ
           03:00
 19


(Xylocaine                                   03:01


2%/
 Epi


(Mdv) 20 ml)


 Ceftriaxone    1 gm           ONCE  ONCE
    19        DC       



Sodium
                       IM
            04:30
 19


(Rocephin)                                   04:30


 Morphine       4 mg           ONCE  STAT
    19        DC            19


Sulfate
                      IM
            04:12
 19                04:22



(morphine)                                   04:13


 Ondansetron    4 mg           ONCE  STAT
    19        DC            19


HCl
  (Zofran                 ODT
           04:12
 19                04:21



Odt)                                         04:13








Procedures/MDM


This case was discussed with my supervising physician, Dr. Iza Paris who 


recommended for me to do an ultrasound of the soft tissue.





Diagnostic tests:


Ultrasound of the soft tissue:





Dr. Iza Paris stated that I could do the I&D.





Treatment: Norco p.o.





Incision and drainage of abscess: I informed the patient that I am not a surgeon


and that scarring will be visible.  She verbalized understanding and verbally 


consented for me to do the incision and drainage.  


Performed with female chaperone, Ailyn EMT.


Sterile technique was observed to hold procedure.  Betadine prep.  Lidocaine 1% 


with epi 2 cc subcu.  Scalpel used to incise.  Drained mucopurulent discharge.  


Pack with iodoform.





Dressing was applied by EMT.





Re-evaluation: No active bleeding.  Stated that she feels much better after at 


this time.  Stated that she is comfortable to go home.





Differential diagnosis


I have low suspicion for deep space infection, sepsis, breast malignancy,





Final diagnosis: Incision and drainage of abscess.





Prescription: Continue taking your antibiotic at home.  Tramadol.  Motrin.  


Omeprazole.





Follow-up with PCP in the next 24-48 hours.  Come back in 2 days for wound check


and removal of packing and/or repacking.  Come back here in the emergency 


department for any new symptoms or any worsening symptoms.  





All questions and concerns were answered.  Patient and family members verbalized


understanding and agreed with plan of care.  





Hemodynamically stable on discharge.





Departure


Diagnosis:  


   Primary Impression:  


   Acute abscess


   Additional Impression:  


   Incisional abscess





Additional Instructions:  


Follow-up with PCP in the next 24-48 hours.  Come back in 2 days for wound check


and removal of packing and/or repacking.  Come back here in the emergency 


department for any new symptoms or any worsening symptoms.











GRAYSON CARTER                2019 02:45

## 2019-07-03 ENCOUNTER — HOSPITAL ENCOUNTER (EMERGENCY)
Dept: HOSPITAL 91 - FTE | Age: 54
Discharge: HOME | End: 2019-07-03
Payer: COMMERCIAL

## 2019-07-03 ENCOUNTER — HOSPITAL ENCOUNTER (EMERGENCY)
Dept: HOSPITAL 10 - FTE | Age: 54
Discharge: HOME | End: 2019-07-03
Payer: COMMERCIAL

## 2019-07-03 VITALS — WEIGHT: 160.34 LBS | BODY MASS INDEX: 37.11 KG/M2 | HEIGHT: 55 IN

## 2019-07-03 VITALS — DIASTOLIC BLOOD PRESSURE: 61 MMHG | SYSTOLIC BLOOD PRESSURE: 105 MMHG | HEART RATE: 68 BPM | RESPIRATION RATE: 20 BRPM

## 2019-07-03 DIAGNOSIS — Z48.01: Primary | ICD-10-CM

## 2019-07-03 PROCEDURE — 99281 EMR DPT VST MAYX REQ PHY/QHP: CPT

## 2019-07-03 NOTE — ERD
ER Documentation


Chief Complaint


Chief Complaint





WOUND CHECK





HPI


53-year-old female presents the emergency department for a wound check.


Patient is 2 days status post I&D of a sebaceous cyst on her anterior chest 


wall.  She reports ongoing drainage but improvement in her pain.  She reports no


fevers or chills.





ROS


All systems reviewed and are negative except as per history of present illness.





Medications


Home Meds


Active Scripts


Tramadol HCl (Tramadol HCl) 50 Mg Tablet, 50 MG PO Q4 PRN for SEVERE PAIN LEVEL 


7-10, #5 TAB


   Prov:PASILAJOSE ALARCONAR F         19


Omeprazole* (Omeprazole*) 40 Mg Capsule.dr, 40 MG PO DAILY, #30 CAP


   Prov:PASILABAN,JOSEAR F         19


Ibuprofen* (Motrin*) 800 Mg Tab, 800 MG PO Q6H PRN for PAIN AND OR ELEVATED 


TEMP, #30 TAB


   Prov:PASILAAGNES,JOSEAR F         19


Naloxone HCl nasal spray (Narcan 4 mg/0.1 mL nasal) 4 Mg Spray, 4 MG NS .Q2-3MIN


for OPIOID OVERDOSE, #2 SPRAY 0 Refills


   Spray 0.1 mL into one nostril.  Repeat with second device into other


   nostril after 2-3 minutes if no or minimal response


   Prov:MANJU ALVAREZ PA-C         19


Hydrocodone/Acetaminophen (Norco 5-325 Tablet) 1 Each Tablet, 1 TAB PO Q6H PRN 


for PAIN, #7 TAB


   Prov:MANJU ALVAREZ PA-C         19


Sulfamethoxazole/Trimethoprim* (Bactrim Ds* Tablet) 1 Each Tablet, 1 TAB PO BID,


#14 TAB


   Prov:MANJU ALVAREZ PA-C         19


Cephalexin* (Keflex*) 500 Mg Capsule, 500 MG PO TID for 7 Days, CAP


   Prov:KIMMANJU PA-C         19


Orphenadrine Citrate (Norflex) 100 Mg Tablet.sa, 100 MG PO BID for 3 Days, 


TAB.SA


   Prov:DONTE,KARISSA C         18


Ibuprofen* (Motrin*) 600 Mg Tab, 600 MG PO Q6, #30 TAB


   Prov:DONTE,KARISSA C         18


Hydrocodone/Acetaminophen (Norco 5-325 Tablet) 1 Each Tablet, 1 TAB PO Q6H PRN 


for PAIN, #10 TAB


   Prov:KARISSA KENT         18


Mineral Oil/Petrolatum,White (ARTIFICIAL TEARS EYE OINT) 3.5 Gm Oint...g., 1 


APPLIC BOTH EYES AS NEEDED PRN for DRY EYES for 7 Days, #1 EA


   Prov:ANIL BLACKBURN PA-C         17


Loratadine* (Claritin*) 10 Mg Tablet, 10 MG PO DAILY, #30 TAB


   Prov:ANIL BLACKBURN PA-C         17


Cyclobenzaprine Hcl* (Cyclobenzaprine Hcl*) 10 Mg Tablet, 10 MG PO TID, #30 TAB


   Prov:ANIL BLACKBURN PA-C         17


Ibuprofen* (Motrin*) 600 Mg Tab, 600 MG PO Q6H PRN for PAIN AND OR ELEVATED 


TEMP, #30 TAB


   Prov:ANIL BLACKBURN PA-C         17


Tramadol HCl (Tramadol HCl) 50 Mg Tablet, 50 MG PO Q4 PRN for PAIN, #20 TAB


   Prov:SURJIT NUNEZ PA-C         17


Cephalexin* (Keflex*) 500 Mg Capsule, 500 MG PO QID for 7 Days, CAP


   Prov:SURJIT NUNEZ PA-C         17


Polymyxin B Sulfate-TMP* (Polymyxin B-TMP Eye Drops*) 10 Ml Drops, 1 DROP BOTH 


EYES QID for 7 Days, EA


   Prov:SURJIT NUNEZ PA-C         17


Hydrocodone/Acetaminophen (Norco  Tablet) 1 Each Tablet, 1 TAB PO Q6H PRN 


for PAIN, #7 TAB


   Prov:ILIANA MINER MD         3/17/17


Sulfamethoxazole-Trimethoprim* (Bactrim* DS) 800-160 Mg Tab, 1 TAB PO BID for 7 


Days, TAB


   Prov:ILIANA MINER MD         3/17/17


Cephalexin* (Keflex*) 500 Mg Capsule, 500 MG PO QID for 7 Days, CAP


   Prov:ILIANA MINER MD         3/17/17


Pseudoephedrine Hcl (Sudafe 12-Hour) 120 Mg Tablet.er, 120 MG PO BID PRN for 


CONGESTION for 14 Days, TAB.SA


   Prov:KRISTA KELSEY PA-C         16


Benzonatate* (Tessalon Perle*) 100 Mg Capsule, 100 MG PO Q8H PRN for COUGH for 


10 Days, CAP


   Prov:KRISTA KELSEY PA-C         16


Amoxicillin* (Amoxicillin*) 500 Mg Cap, 500 MG PO BID for 7 Days, #20 CAP


   Prov:ALISA KELSEYMAT BURKETT-C         16


Neomycin/Polymyxin/Hydrocort* (Cortisporin* Otic) 10 Ml Susp, 4 DROP LEFT EAR 


QID, #1 EA


   Prov:KRISTA KELSEY PA-C         16





Allergies


Allergies:  


Coded Allergies:  


     No Known Allergy (Unverified , 17)





PMhx/Soc


History of Surgery:  Yes (appy)


Anesthesia Reaction:  No


Hx Neurological Disorder:  Yes (neuropathy- gabapentin)


Hx Psychiatric Problems:  Yes (depression)


Hx Alcohol Use:  No


Hx Substance Use:  No


Hx Tobacco Use:  No





Physical Exam


Vitals





Vital Signs


  Date      Temp  Pulse  Resp  B/P (MAP)   Pulse Ox  O2          O2 Flow    FiO2


Time                                                 Delivery    Rate


    7/3/19  97.1     68    20      105/61       100


     07:03                           (76)





Physical Exam


General: well developed, well nourished, in no distress.


Neuro: Normal speech, gait, balance


Skin: Patient has a sebaceous cyst status post I&D.  Appears to be healing 


normally.  After removal of the packing there is no evidence of significant 


purulent discharge or drainage.  There is no cellulitis.





Procedures/MDM


Patient was taken to a room, seen and examined





Medical decision makin-year-old female presents status post an I&D for a 


wound check with no evidence of wound complications.





Departure


Diagnosis:  


   Primary Impression:  


   Encounter for wound re-check


Condition:  Stable


Patient Instructions:  Wound Care, Sebaceous Cyst, Infected (I And D)


Referrals:  


YASHIRA ONEAL MD (PCP)





Additional Instructions:  


finish your antibiotics.


change the dressing twice a day 


keep the area clean











LEIGH KILGORE                 Jul 3, 2019 07:23

## 2019-08-01 ENCOUNTER — HOSPITAL ENCOUNTER (EMERGENCY)
Dept: HOSPITAL 91 - FTE | Age: 54
Discharge: HOME | End: 2019-08-01
Payer: COMMERCIAL

## 2019-08-01 ENCOUNTER — HOSPITAL ENCOUNTER (EMERGENCY)
Dept: HOSPITAL 10 - FTE | Age: 54
Discharge: HOME | End: 2019-08-01
Payer: COMMERCIAL

## 2019-08-01 VITALS — SYSTOLIC BLOOD PRESSURE: 132 MMHG | RESPIRATION RATE: 18 BRPM | HEART RATE: 89 BPM | DIASTOLIC BLOOD PRESSURE: 73 MMHG

## 2019-08-01 VITALS
HEIGHT: 65 IN | WEIGHT: 170.64 LBS | BODY MASS INDEX: 28.43 KG/M2 | BODY MASS INDEX: 28.43 KG/M2 | WEIGHT: 170.64 LBS | HEIGHT: 65 IN

## 2019-08-01 DIAGNOSIS — S61.216A: Primary | ICD-10-CM

## 2019-08-01 DIAGNOSIS — Z23: ICD-10-CM
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
## 2019-08-01 PROCEDURE — 99283 EMERGENCY DEPT VISIT LOW MDM: CPT

## 2019-08-01 PROCEDURE — 90471 IMMUNIZATION ADMIN: CPT

## 2019-08-01 PROCEDURE — 90715 TDAP VACCINE 7 YRS/> IM: CPT

## 2019-08-01 PROCEDURE — 12001 RPR S/N/AX/GEN/TRNK 2.5CM/<: CPT

## 2019-08-01 PROCEDURE — 73130 X-RAY EXAM OF HAND: CPT

## 2019-08-01 RX ADMIN — IBUPROFEN 1 MG: 800 TABLET, FILM COATED ORAL at 14:08

## 2019-08-01 RX ADMIN — BACITRACIN ZINC 1 APPLIC: 500 OINTMENT TOPICAL at 14:53

## 2019-08-01 RX ADMIN — LIDOCAINE HYDROCHLORIDE 1 ML: 10 INJECTION, SOLUTION EPIDURAL; INFILTRATION; INTRACAUDAL; PERINEURAL at 14:35

## 2019-08-01 RX ADMIN — CLOSTRIDIUM TETANI TOXOID ANTIGEN (FORMALDEHYDE INACTIVATED), CORYNEBACTERIUM DIPHTHERIAE TOXOID ANTIGEN (FORMALDEHYDE INACTIVATED), BORDETELLA PERTUSSIS TOXOID ANTIGEN (GLUTARALDEHYDE INACTIVATED), BORDETELLA PERTUSSIS FILAMENTOUS HEMAGGLUTININ ANTIGEN (FORMALDEHYDE INACTIVATED), BORDETELLA PERTUSSIS PERTACTIN ANTIGEN, AND BORDETELLA PERTUSSIS FIMBRIAE 2/3 ANTIGEN 1 ML: 5; 2; 2.5; 5; 3; 5 INJECTION, SUSPENSION INTRAMUSCULAR at 14:09

## 2019-08-01 NOTE — ERD
ER Documentation


Chief Complaint


Chief Complaint





L index finger lac while washing dishes, cut by broken porcelain cup





HPI


53 year old female reports to ED. She states she was washing dishes when a 


porcelain cup broke and cut open her pinky finger on her right hand. She has 


controlled the bleeding prior to the ED visit. She is not UTD on tetanus 


vaccine. She reports 6/10 pain that is constant and localized. Denies previous 


injury to the right hand.





ROS


All systems reviewed and are negative except as per history of present illness.





Medications


Home Meds


Active Scripts


Ibuprofen* (Motrin*) 600 Mg Tab, 600 MG PO Q6H PRN for PAIN AND OR ELEVATED 


TEMP, #30 TAB


   Prov:JESSY JUARES PA-C         8/1/19


Tramadol HCl (Tramadol HCl) 50 Mg Tablet, 50 MG PO Q4 PRN for SEVERE PAIN LEVEL 


7-10, #5 TAB


   Prov:GRAYSON CARTER         7/2/19


Omeprazole* (Omeprazole*) 40 Mg Capsule.dr, 40 MG PO DAILY, #30 CAP


   Prov:GRAYSON CARTER         7/2/19


Ibuprofen* (Motrin*) 800 Mg Tab, 800 MG PO Q6H PRN for PAIN AND OR ELEVATED 


TEMP, #30 TAB


   Prov:GRAYSON CARTER         7/2/19


Naloxone HCl nasal spray (Narcan 4 mg/0.1 mL nasal) 4 Mg Spray, 4 MG NS .Q2-3MIN


for OPIOID OVERDOSE, #2 SPRAY 0 Refills


   Spray 0.1 mL into one nostril.  Repeat with second device into other


   nostril after 2-3 minutes if no or minimal response


   Prov:MANJU ALVAREZ PA-C         6/27/19


Hydrocodone/Acetaminophen (Norco 5-325 Tablet) 1 Each Tablet, 1 TAB PO Q6H PRN 


for PAIN, #7 TAB


   Prov:MANJU ALVAREZ PA-C         6/27/19


Sulfamethoxazole/Trimethoprim* (Bactrim Ds* Tablet) 1 Each Tablet, 1 TAB PO BID,


#14 TAB


   Prov:MANJU ALVAREZ PA-C         6/27/19


Cephalexin* (Keflex*) 500 Mg Capsule, 500 MG PO TID for 7 Days, CAP


   Prov:MANJU ALVAREZ PA-C         6/27/19


Orphenadrine Citrate (Norflex) 100 Mg Tablet.sa, 100 MG PO BID for 3 Days, 


TAB.SA


   Prov:KARISSA KENT RENNY         6/1/18


Ibuprofen* (Motrin*) 600 Mg Tab, 600 MG PO Q6, #30 TAB


   Prov:KARISSA KENT C         6/1/18


Hydrocodone/Acetaminophen (Norco 5-325 Tablet) 1 Each Tablet, 1 TAB PO Q6H PRN 


for PAIN, #10 TAB


   Prov:DONTEKARISSA ROSS RENNY         6/1/18


Mineral Oil/Petrolatum,White (ARTIFICIAL TEARS EYE OINT) 3.5 Gm Oint...g., 1 


APPLIC BOTH EYES AS NEEDED PRN for DRY EYES for 7 Days, #1 EA


   Prov:ANIL BLACKBURN PA-C         12/20/17


Loratadine* (Claritin*) 10 Mg Tablet, 10 MG PO DAILY, #30 TAB


   Prov:ANIL BLACKBURN PA-C         12/20/17


Cyclobenzaprine Hcl* (Cyclobenzaprine Hcl*) 10 Mg Tablet, 10 MG PO TID, #30 TAB


   Prov:ANIL BLACKBURN PA-C         12/20/17


Ibuprofen* (Motrin*) 600 Mg Tab, 600 MG PO Q6H PRN for PAIN AND OR ELEVATED 


TEMP, #30 TAB


   Prov:ANIL BLACKBURN PA-C         12/20/17


Tramadol HCl (Tramadol HCl) 50 Mg Tablet, 50 MG PO Q4 PRN for PAIN, #20 TAB


   Prov:SURJIT NUNEZ PA-C         9/20/17


Cephalexin* (Keflex*) 500 Mg Capsule, 500 MG PO QID for 7 Days, CAP


   Prov:SURJIT NUNEZ PA-C         9/20/17


Polymyxin B Sulfate-TMP* (Polymyxin B-TMP Eye Drops*) 10 Ml Drops, 1 DROP BOTH 


EYES QID for 7 Days, EA


   Prov:SURJIT NUNEZ PA-C         9/20/17


Hydrocodone/Acetaminophen (Norco  Tablet) 1 Each Tablet, 1 TAB PO Q6H PRN 


for PAIN, #7 TAB


   Prov:ILIANA MINER MD         3/17/17


Sulfamethoxazole-Trimethoprim* (Bactrim* DS) 800-160 Mg Tab, 1 TAB PO BID for 7 


Days, TAB


   Prov:ILIANA MINER MD         3/17/17


Cephalexin* (Keflex*) 500 Mg Capsule, 500 MG PO QID for 7 Days, CAP


   Prov:ILIANA MINER MD         3/17/17


Pseudoephedrine Hcl (Sudafe 12-Hour) 120 Mg Tablet.er, 120 MG PO BID PRN for 


CONGESTION for 14 Days, TAB.SA


   Prov:KRISTA KELSEYC         11/24/16


Benzonatate* (Tessalon Perle*) 100 Mg Capsule, 100 MG PO Q8H PRN for COUGH for 


10 Days, CAP


   Prov:KRISTA KELSEY-C         11/24/16


Amoxicillin* (Amoxicillin*) 500 Mg Cap, 500 MG PO BID for 7 Days, #20 CAP


   Prov:KRISTA KELSEY-C         11/24/16


Neomycin/Polymyxin/Hydrocort* (Cortisporin* Otic) 10 Ml Susp, 4 DROP LEFT EAR 


QID, #1 EA


   Prov:KRISTA KELSEY-C         11/24/16





Allergies


Allergies:  


Coded Allergies:  


     No Known Allergy (Unverified , 12/20/17)





PMhx/Soc


History of Surgery:  Yes (appy, I AND D TO BREAST AREA)


Anesthesia Reaction:  No


Hx Neurological Disorder:  Yes (neuropathy- gabapentin)


Hx Psychiatric Problems:  Yes (depression)


Hx Alcohol Use:  No


Hx Substance Use:  No


Hx Tobacco Use:  No





FmHx


Family History:  No diabetes





Physical Exam


Vitals





Vital Signs


  Date      Temp  Pulse  Resp  B/P (MAP)   Pulse Ox  O2          O2 Flow    FiO2


Time                                                 Delivery    Rate


    8/1/19  97.9     89    18      132/73        99


     12:27                           (92)





Physical Exam


Const:   No acute distress


Head:   Atraumatic 


Resp:   Clear to auscultation bilaterally


Cardio:   Regular rate and rhythm, 


Abd:    Soft, non tender, non distended. 


Skin:   small 1/2 laceration on right hand pinky finger, bleeding controlled. 


Good ROM and strength, 2+ pulses


Ext:    No cyanosis, or edema


Neur:   Awake and alert


Psych:    Normal Mood and Affect


Results 24 hrs





Current Medications


 Medications
   Dose
          Sig/Tess
       Start Time
   Status  Last


 (Trade)       Ordered        Route
 PRN     Stop Time              Admin
Dose


                              Reason                                Admin


 Ibuprofen
     800 mg         ONCE  ONCE
    8/1/19        DC            8/1/19


(Motrin)                      PO
            14:00
 8/1/19                14:08



                                             14:01


 Diphtheria/
   0.5 ml         ONCE ONCE
     8/1/19        DC            8/1/19


Tetanus/Acell                 IM*
           14:00
 8/1/19                14:09




 Pertussis
                                 14:01


(Adacel)


 Lidocaine
     5 ml           ONCE  ONCE
    8/1/19        DC       



(Xylocaine                    INFIL
         14:30
 8/1/19


1%
  (Mpf))                                  14:31


 Bacitracin
    1 applic       ONCE  ONCE
    8/1/19        DC       



(Bacitracin                   TOP
           15:00
 8/1/19


0.5%/
 Zinc                                  15:01


Oint)








Procedures/MDM


ED COURSE:


The patient was stable throughout ED course. I kept the patient informed of 


laboratory and diagnostic imaging results throughout the ED course.  








DIAGNOSTIC IMAGING:


Read by radiologist.


PROCEDURE:   XR Hand. 


 


CLINICAL INDICATION: Hand injury. Porcelin cup broke. Evaluate foreign body


 


TECHNIQUE:   PA, oblique and lateral views of the right hand were obtained. 


 


COMPARISON:   None available. 


 


FINDINGS:


No fracture is identified.  The osseous structures are intact.  The joint spaces


are preserved.  No radiopaque foreign body is identified.  


 


IMPRESSION:


No radiopaque foreign body, or acute osseous abnormality identified.


 


RPTAT: VV


_____________________________________________ 


.Trino Guo MD, MD           Date    Time 


Electronically viewed and signed by .Trino Guo MD, MD on 08/01/2019 14:13 








PROCEDURES:


LACERATION:


The patient was verbally consented prior to procedure. Patient was explained the


risks, benefits


and alternatives to this procedure.


Location: right hand, pinky 


Length: 1/2 inch


Anesthesia: local 1% lidocaine, 5 cc


Inspection: The wound was thoroughly explored and no foreign bodies, deep 


tissue, tendon or


structural injuries were noted.


Repair: The area was prepared and draped in the usual sterile manner with the 


wound exposed.


2 sutures were placed with good wound closure and wound approximation. Bleeding 


was


minimal. The patient tolerated the procedure well with no complications. The 


wound was dressed


with bacitracin and sterile gauze. The patient was neurovascularly intact post-


procedure. Post-


procedural wound care was discussed with the patient.











MEDICATIONS GIVEN: 


TDAP, lidocaine


Patient tolerated medication well with no adverse reactions. Patient reported 


improvement in pain. 











MEDICAL DECISION MAKING:


Patient is a 53 year old female reporting for a laceration to the right hand 


pinky earlier today. 


Patient's bleeding was easily controlled in the department and there is no 


indication of anemia.


No evidence of compartment syndrome, neurologic injury, vascular injury, open 


joint, tendon laceration, or foreign body.


Patient is appropriate for outpatient follow up.





Anesthesia:                            1% lidocaine locally


Location:                                 right pinky


Tendon/Joint/Nerves:              No injury


Foreign body:                         None detected after copious irrigation and


exploration


Technique:                               Simple Interrupted Sutures


Complexity:                              No subcutaneous sutures/mucosal 


repair/edge excision


Post Closure Length:       1/2 inch








48 hour wound check.  Scar minimization instructions given.





Vital signs were reviewed. Patient is afebrile. Patient was not hypoxic. Patient


was hemodynamically stable. Patient was told to follow up with primary care for 


further care and management. 








PRESCRIPTION: Motrin





DISCHARGE:


At this time, patient is stable for discharge and outpatient management. I have 


instructed the patient to follow-up with their primary care physician in 1-2 


days. I have discussed with the patient the possibility of needing to see a 


specialist for further workup and imaging studies if symptoms persist. I have 


instructed the patient to promptly return to the ER for any new or worsening 


symptoms including increased pain, fever, nausea, vomiting, weakness or LOC. The


patient expressed understanding of and agreement with this plan. All questions 


were answered. Home care instructions were provided. 





Disclaimer: Inadvertent spelling and grammatical errors are likely due to 


EHR/dictation software use and do not reflect on the overall quality of patient 


care. Also, please note that the electronic time recorded on this note does not 


necessarily reflect the actual time of the patient encounter.





Departure


Diagnosis:  


   Primary Impression:  


   Laceration


Condition:  Fair


Patient Instructions:  Laceration, Hand


Referrals:  


YASHIRA ONEAL MD (PCP)








[facility]


YOU HAVE RECEIVED A MEDICAL SCREENING EXAM AND THE RESULTS INDICATE THAT YOU DO 


NOT HAVE A CONDITION THAT REQUIRES URGENT TREATMENT IN THE EMERGENCY DEPARTMENT.





FURTHER EVALUATION AND TREATMENT OF YOUR CONDITION CAN WAIT UNTIL YOU ARE SEEN 


IN YOUR DOCTORS OFFICE WITHIN THE NEXT 1-2 DAYS. IT IS YOUR RESPONSIBILITY TO 


MAKE AN APPOINTMENT FOR FOLOW-UP CARE.





IF YOU HAVE A PRIMARY DOCTOR


--you should call your primary doctor and schedule an appointment





IF YOU DO NOT HAVE A PRIMARY DOCTOR YOU CAN CALL OUR PHYSICIAN REFERRAL HOTLINE 


AT


 [phone] 





IF YOU CAN NOT AFFORD TO SEE A PHYSICIAN YOU CAN CHOSE FROM THE FOLLOWING 


[facility] CLINICS





[facility] [phone] [address] [place]





[facility] [phone] [facility]





[facility] [phone] [facility] [phone] [address] [facility] [phone] [address] [facility]. [facility] [phone] [address] [facility]. [facility]


YOU HAVE RECEIVED A MEDICAL SCREENING EXAM AND THE RESULTS INDICATE THAT YOU DO 


NOT HAVE A CONDITION THAT REQUIRES URGENT TREATMENT IN THE EMERGENCY DEPARTMENT.





FURTHER EVALUATION AND TREATMENT OF YOUR CONDITION CAN WAIT UNTIL YOU ARE SEEN 


IN YOUR DOCTORS OFFICE WITHIN THE NEXT 1-2 DAYS. IT IS YOUR RESPONSIBILITY TO 


MAKE AN APPOINTMENT FOR FOLOW-UP CARE.





IF YOU HAVE A PRIMARY DOCTOR


--you should call your primary doctor and schedule and appointment





IF YOU DO NOT HAVE A PRIMARY DOCTOR YOU CAN CALL OUR PHYSICIAN REFERRAL HOTLINE 


AT [phone].





IF YOU CAN NOT AFFORD TO SEE A PHYSICIAN YOU CAN CHOSE FROM THE FOLLOWING [facility]:





[facility]


[address] [place] [address]





[facility]


[address] [place] [address]





[facility]


[address] [place] [address]





Additional Instructions:  


Return back to this ED in 2 days for wound recheck





Call your primary care doctor TOMORROW for an appointment during the next 1-2 


days.See the doctor sooner or return here if your condition worsens before your 


appointment time.











JESSY JUARES PA-C            Aug 1, 2019 14:52